# Patient Record
Sex: MALE | Race: WHITE | URBAN - METROPOLITAN AREA
[De-identification: names, ages, dates, MRNs, and addresses within clinical notes are randomized per-mention and may not be internally consistent; named-entity substitution may affect disease eponyms.]

---

## 2022-10-26 ENCOUNTER — APPOINTMENT (RX ONLY)
Dept: URBAN - METROPOLITAN AREA CLINIC 332 | Facility: CLINIC | Age: 52
Setting detail: DERMATOLOGY
End: 2022-10-26

## 2022-10-26 DIAGNOSIS — D22 MELANOCYTIC NEVI: ICD-10-CM

## 2022-10-26 DIAGNOSIS — L72.8 OTHER FOLLICULAR CYSTS OF THE SKIN AND SUBCUTANEOUS TISSUE: ICD-10-CM

## 2022-10-26 DIAGNOSIS — L82.1 OTHER SEBORRHEIC KERATOSIS: ICD-10-CM

## 2022-10-26 PROBLEM — D22.39 MELANOCYTIC NEVI OF OTHER PARTS OF FACE: Status: ACTIVE | Noted: 2022-10-26

## 2022-10-26 PROBLEM — D48.5 NEOPLASM OF UNCERTAIN BEHAVIOR OF SKIN: Status: ACTIVE | Noted: 2022-10-26

## 2022-10-26 PROCEDURE — 99203 OFFICE O/P NEW LOW 30 MIN: CPT | Mod: 25

## 2022-10-26 PROCEDURE — 11102 TANGNTL BX SKIN SINGLE LES: CPT

## 2022-10-26 PROCEDURE — ? OBSERVATION AND MEASURE

## 2022-10-26 PROCEDURE — ? BIOPSY BY SHAVE METHOD

## 2022-10-26 PROCEDURE — ? SUNSCREEN RECOMMENDATIONS

## 2022-10-26 PROCEDURE — ? COUNSELING

## 2022-10-26 ASSESSMENT — LOCATION ZONE DERM
LOCATION ZONE: LEG
LOCATION ZONE: SCALP
LOCATION ZONE: NECK
LOCATION ZONE: FACE

## 2022-10-26 ASSESSMENT — LOCATION SIMPLE DESCRIPTION DERM
LOCATION SIMPLE: POSTERIOR NECK
LOCATION SIMPLE: LEFT SCALP
LOCATION SIMPLE: LEFT CHEEK
LOCATION SIMPLE: LEFT PRETIBIAL REGION
LOCATION SIMPLE: SCALP

## 2022-10-26 ASSESSMENT — LOCATION DETAILED DESCRIPTION DERM
LOCATION DETAILED: LEFT DISTAL PRETIBIAL REGION
LOCATION DETAILED: LEFT INFERIOR POSTERIOR NECK
LOCATION DETAILED: LEFT CENTRAL MALAR CHEEK
LOCATION DETAILED: LEFT MEDIAL FRONTAL SCALP
LOCATION DETAILED: LEFT SUPERIOR PARIETAL SCALP

## 2022-10-26 NOTE — PROCEDURE: BIOPSY BY SHAVE METHOD
Detail Level: Detailed
Depth Of Biopsy: dermis
Was A Bandage Applied: Yes
Size Of Lesion In Cm: 0.8
X Size Of Lesion In Cm: 0
Biopsy Type: H and E
Biopsy Method: double edge Personna blade
Anesthesia Type: 1% lidocaine with epinephrine
Anesthesia Volume In Cc (Will Not Render If 0): 0.5
Hemostasis: Drysol
Wound Care: Petrolatum
Dressing: bandage
Destruction After The Procedure: No
Type Of Destruction Used: Curettage
Curettage Text: The wound bed was treated with curettage after the biopsy was performed.
Cryotherapy Text: The wound bed was treated with cryotherapy after the biopsy was performed.
Electrodesiccation Text: The wound bed was treated with electrodesiccation after the biopsy was performed.
Electrodesiccation And Curettage Text: The wound bed was treated with electrodesiccation and curettage after the biopsy was performed.
Silver Nitrate Text: The wound bed was treated with silver nitrate after the biopsy was performed.
Consent: Verbal consent was obtained and risks were reviewed including but not limited to scarring, infection, bleeding, scabbing, incomplete removal, nerve damage and allergy to anesthesia.
Post-Care Instructions: I reviewed with the patient in detail post-care instructions. Patient is to keep the biopsy site dry overnight, and then apply bacitracin twice daily until healed. Patient may apply hydrogen peroxide soaks to remove any crusting.
Notification Instructions: Patient will be notified of biopsy results. However, patient instructed to call the office if not contacted within 2 weeks.
Billing Type: Third-Party Bill
Information: Selecting Yes will display possible errors in your note based on the variables you have selected. This validation is only offered as a suggestion for you. PLEASE NOTE THAT THE VALIDATION TEXT WILL BE REMOVED WHEN YOU FINALIZE YOUR NOTE. IF YOU WANT TO FAX A PRELIMINARY NOTE YOU WILL NEED TO TOGGLE THIS TO 'NO' IF YOU DO NOT WANT IT IN YOUR FAXED NOTE.

## 2022-12-05 ENCOUNTER — APPOINTMENT (RX ONLY)
Dept: URBAN - METROPOLITAN AREA CLINIC 332 | Facility: CLINIC | Age: 52
Setting detail: DERMATOLOGY
End: 2022-12-05

## 2022-12-05 VITALS — SYSTOLIC BLOOD PRESSURE: 128 MMHG | HEART RATE: 70 BPM | DIASTOLIC BLOOD PRESSURE: 80 MMHG

## 2022-12-05 PROBLEM — C44.319 BASAL CELL CARCINOMA OF SKIN OF OTHER PARTS OF FACE: Status: ACTIVE | Noted: 2022-12-05

## 2022-12-05 PROCEDURE — 17311 MOHS 1 STAGE H/N/HF/G: CPT

## 2022-12-05 PROCEDURE — ? MOHS SURGERY

## 2022-12-05 PROCEDURE — ? PRESCRIPTION

## 2022-12-05 PROCEDURE — 13132 CMPLX RPR F/C/C/M/N/AX/G/H/F: CPT

## 2022-12-05 PROCEDURE — 17312 MOHS ADDL STAGE: CPT

## 2022-12-05 PROCEDURE — ? ADDITIONAL NOTES

## 2022-12-05 RX ORDER — MUPIROCIN 20 MG/G
1 OINTMENT TOPICAL BID
Qty: 22 | Refills: 2 | Status: ERX | COMMUNITY
Start: 2022-12-05

## 2022-12-05 RX ADMIN — MUPIROCIN 1: 20 OINTMENT TOPICAL at 00:00

## 2022-12-05 NOTE — PROCEDURE: MOHS SURGERY
2 person assist/verbal cues/set-up required Mercedes Flap Text: The defect edges were debeveled with a #15 scalpel blade.  Given the location of the defect, shape of the defect and the proximity to free margins a Mercedes flap was deemed most appropriate.  Using a sterile surgical marker, an appropriate advancement flap was drawn incorporating the defect and placing the expected incisions within the relaxed skin tension lines where possible. The area thus outlined was incised deep to adipose tissue with a #15 scalpel blade.  The skin margins were undermined to an appropriate distance in all directions utilizing iris scissors.

## 2022-12-05 NOTE — PROCEDURE: MOHS SURGERY
Subjective     History provided by:  Patient   used: No    Nausea  The primary symptoms include nausea and vomiting. Primary symptoms do not include fever, weight loss, fatigue, abdominal pain, diarrhea, melena, hematemesis, jaundice, hematochezia, dysuria, myalgias, arthralgias or rash. The illness began today. The onset was sudden.   The illness is also significant for chills. The illness does not include anorexia, dysphagia, odynophagia, bloating, constipation, tenesmus, back pain or itching. Associated medical issues do not include inflammatory bowel disease, GERD, gallstones, liver disease, alcohol abuse, PUD, gastric bypass, bowel resection, irritable bowel syndrome, hemorrhoids or diverticulitis.       Review of Systems   Constitutional: Positive for chills. Negative for activity change, appetite change, diaphoresis, fatigue, fever and weight loss.   HENT: Negative for congestion, ear pain and sore throat.    Eyes: Negative for redness.   Respiratory: Negative for cough, chest tightness, shortness of breath and wheezing.    Cardiovascular: Negative for chest pain, palpitations and leg swelling.   Gastrointestinal: Positive for nausea and vomiting. Negative for abdominal pain, anorexia, bloating, constipation, diarrhea, dysphagia, hematemesis, hematochezia, jaundice and melena.   Genitourinary: Negative for dysuria and urgency.   Musculoskeletal: Negative for arthralgias, back pain, myalgias and neck pain.   Skin: Negative for itching, pallor, rash and wound.   Neurological: Negative for dizziness, speech difficulty, weakness and headaches.   Psychiatric/Behavioral: Negative for agitation, behavioral problems, confusion and decreased concentration.   All other systems reviewed and are negative.      Past Medical History:   Diagnosis Date   • Acid reflux    • Diabetes mellitus (CMS/HCC)    • Gallbladder abscess    • GERD (gastroesophageal reflux disease)    • Hypertension    • Palpitation         No Known Allergies    Past Surgical History:   Procedure Laterality Date   • CHOLECYSTECTOMY     • VAGINAL DELIVERY  81; 84    fe/male       Family History   Problem Relation Age of Onset   • Heart disease Mother    • Heart failure Maternal Grandmother        Social History     Socioeconomic History   • Marital status: Single     Spouse name: Not on file   • Number of children: Not on file   • Years of education: Not on file   • Highest education level: Not on file   Tobacco Use   • Smoking status: Former Smoker     Packs/day: 0.25     Types: Cigarettes     Quit date:      Years since quittin.2   • Smokeless tobacco: Never Used   Substance and Sexual Activity   • Alcohol use: No   • Drug use: No   • Sexual activity: Defer     Comment: , works as Teller           Objective   Physical Exam  Vitals and nursing note reviewed.   Constitutional:       General: She is not in acute distress.     Appearance: Normal appearance. She is well-developed. She is not toxic-appearing or diaphoretic.   HENT:      Head: Normocephalic and atraumatic.      Right Ear: External ear normal.      Left Ear: External ear normal.      Nose: Nose normal.      Mouth/Throat:      Pharynx: No oropharyngeal exudate.      Tonsils: No tonsillar exudate.   Eyes:      General: Lids are normal.      Conjunctiva/sclera: Conjunctivae normal.      Pupils: Pupils are equal, round, and reactive to light.   Neck:      Thyroid: No thyromegaly.   Cardiovascular:      Rate and Rhythm: Normal rate and regular rhythm.      Pulses: Normal pulses.      Heart sounds: Normal heart sounds, S1 normal and S2 normal.   Pulmonary:      Effort: Pulmonary effort is normal. No tachypnea or respiratory distress.      Breath sounds: Normal breath sounds. No decreased breath sounds, wheezing or rales.   Chest:      Chest wall: No tenderness.   Abdominal:      General: Bowel sounds are normal. There is no distension.      Palpations: Abdomen is soft.       Tenderness: There is no abdominal tenderness. There is no guarding or rebound.   Musculoskeletal:         General: No tenderness or deformity. Normal range of motion.      Cervical back: Full passive range of motion without pain, normal range of motion and neck supple.   Lymphadenopathy:      Cervical: No cervical adenopathy.   Skin:     General: Skin is warm and dry.      Coloration: Skin is not pale.      Findings: No erythema or rash.   Neurological:      Mental Status: She is alert and oriented to person, place, and time.      GCS: GCS eye subscore is 4. GCS verbal subscore is 5. GCS motor subscore is 6.      Cranial Nerves: No cranial nerve deficit.      Sensory: No sensory deficit.   Psychiatric:         Speech: Speech normal.         Behavior: Behavior normal.         Thought Content: Thought content normal.         Judgment: Judgment normal.         Procedures           ED Course  ED Course as of Mar 25 0756   Wed Mar 24, 2021   0820 IMPRESSION:  Large volume stool.    XR Abdomen KUB [ES]      ED Course User Index  [ES] Jorge Dooley MD                                           MDM  Number of Diagnoses or Management Options     Amount and/or Complexity of Data Reviewed  Clinical lab tests: ordered and reviewed  Tests in the radiology section of CPT®: ordered and reviewed  Tests in the medicine section of CPT®: ordered and reviewed  Review and summarize past medical records: yes  Independent visualization of images, tracings, or specimens: yes    Risk of Complications, Morbidity, and/or Mortality  Presenting problems: moderate  Diagnostic procedures: moderate  Management options: moderate    Patient Progress  Patient progress: stable      Final diagnoses:   Bacterial UTI       ED Disposition  ED Disposition     ED Disposition Condition Comment    Discharge Stable           Elizabeth Kimball PA  [address] [place] [address] [phone]    Schedule an appointment as soon as  possible for a visit in 1 day  REEVALUATE         Medication List      New Prescriptions    cefdinir 300 MG capsule  Commonly known as: OMNICEF  Take 1 capsule by mouth 2 (Two) Times a Day.     ketorolac 10 MG tablet  Commonly known as: TORADOL  Take 1 tablet by mouth Every 8 (Eight) Hours As Needed for Moderate Pain  or Severe Pain .     promethazine 25 MG tablet  Commonly known as: PHENERGAN  Take 1 tablet by mouth Every 6 (Six) Hours As Needed for Nausea or Vomiting.           Where to Get Your Medications      You can get these medications from any pharmacy    Bring a paper prescription for each of these medications  · cefdinir 300 MG capsule  · ketorolac 10 MG tablet  · promethazine 25 MG tablet          Jorge Dooley MD  03/25/21 1854     Vermilion Border Text: The closure involved the vermilion border.

## 2022-12-05 NOTE — PROCEDURE: MOHS SURGERY
Blood pressure 122/64, pulse 63, temperature 97.2 °F (36.2 °C), resp. rate 16, weight 63.3 kg, SpO2 98 %.    Mona Wilcox. [facility]. November 20. She is seen for review of chronic medical problems. She is seated at the dining room table with her sunglasses. She does not respond verbally but opens her eyes looks toward me. She has a slightly disconjugate gaze. Chest is clear anterolaterally. Rhythm is sinus. I hear no cardiac murmur. She has no pedal edema. She has advanced dementia. Medications include p.r.n. acetaminophen, amlodipine 2.5 mg in the morning, artificial tears every 4 hours p.r.n., aspirin 81 mg daily, p.r.n. laxatives, chlorhexidine 0.12% solution twice daily for gingivitis, cranberry capsule 450 mg twice daily, fentanyl 12 µg every 72 hours, furosemide 20 mg daily, hydrocodone with acetaminophen 5-325 milligrams twice daily, lisinopril 20 mg daily p.r.n. nitroglycerin, p.r.n. nystatin powder, Proctosol cream for hemorrhoids and risperidone 0.25 mg daily as well as sertraline 50 mg daily and Senexon S8.6-50 mg 2 tablets twice daily for constipation.    Basic metabolic panel was unremarkable with a creatinine is 0.78, potassium 4.4 on May 30. Hemoglobin is 11 g on May 26, 2016. No additional changes made in medications or treatments.    Next visit: 8 weeks or prn   Dermal Autograft Text: The defect edges were debeveled with a #15 scalpel blade.  Given the location of the defect, shape of the defect and the proximity to free margins a dermal autograft was deemed most appropriate.  Using a sterile surgical marker, the primary defect shape was transferred to the donor site. The area thus outlined was incised deep to adipose tissue with a #15 scalpel blade.  The harvested graft was then trimmed of adipose and epidermal tissue until only dermis was left.  The skin graft was then placed in the primary defect and oriented appropriately.

## 2022-12-05 NOTE — PROCEDURE: MOHS SURGERY
3 Mastoid Interpolation Flap Text: A decision was made to reconstruct the defect utilizing an interpolation axial flap and a staged reconstruction.  A telfa template was made of the defect.  This telfa template was then used to outline the mastoid interpolation flap.  The donor area for the pedicle flap was then injected with anesthesia.  The flap was excised through the skin and subcutaneous tissue down to the layer of the underlying musculature.  The pedicle flap was carefully excised within this deep plane to maintain its blood supply.  The edges of the donor site were undermined.   The donor site was closed in a primary fashion.  The pedicle was then rotated into position and sutured.  Once the tube was sutured into place, adequate blood supply was confirmed with blanching and refill.  The pedicle was then wrapped with xeroform gauze and dressed appropriately with a telfa and gauze bandage to ensure continued blood supply and protect the attached pedicle. 2

## 2022-12-12 ENCOUNTER — APPOINTMENT (RX ONLY)
Dept: URBAN - METROPOLITAN AREA CLINIC 332 | Facility: CLINIC | Age: 52
Setting detail: DERMATOLOGY
End: 2022-12-12

## 2022-12-12 DIAGNOSIS — Z48.02 ENCOUNTER FOR REMOVAL OF SUTURES: ICD-10-CM

## 2022-12-12 PROCEDURE — ? SUTURE REMOVAL (GLOBAL PERIOD)

## 2022-12-12 PROCEDURE — 99024 POSTOP FOLLOW-UP VISIT: CPT

## 2022-12-12 PROCEDURE — ? ADDITIONAL NOTES

## 2022-12-12 ASSESSMENT — LOCATION DETAILED DESCRIPTION DERM: LOCATION DETAILED: RIGHT CENTRAL ZYGOMA

## 2022-12-12 ASSESSMENT — LOCATION SIMPLE DESCRIPTION DERM: LOCATION SIMPLE: RIGHT ZYGOMA

## 2022-12-12 ASSESSMENT — LOCATION ZONE DERM: LOCATION ZONE: FACE

## 2022-12-12 NOTE — PROCEDURE: SUTURE REMOVAL (GLOBAL PERIOD)
Detail Level: Detailed
Add 48698 Cpt? (Important Note: In 2017 The Use Of 12995 Is Being Tracked By Cms To Determine Future Global Period Reimbursement For Global Periods): yes

## 2023-02-23 ENCOUNTER — APPOINTMENT (RX ONLY)
Dept: URBAN - METROPOLITAN AREA CLINIC 332 | Facility: CLINIC | Age: 53
Setting detail: DERMATOLOGY
End: 2023-02-23

## 2023-02-23 DIAGNOSIS — D18.0 HEMANGIOMA: ICD-10-CM

## 2023-02-23 DIAGNOSIS — L82.1 OTHER SEBORRHEIC KERATOSIS: ICD-10-CM

## 2023-02-23 DIAGNOSIS — L81.4 OTHER MELANIN HYPERPIGMENTATION: ICD-10-CM

## 2023-02-23 DIAGNOSIS — L90.5 SCAR CONDITIONS AND FIBROSIS OF SKIN: ICD-10-CM

## 2023-02-23 DIAGNOSIS — D22 MELANOCYTIC NEVI: ICD-10-CM

## 2023-02-23 PROBLEM — D18.01 HEMANGIOMA OF SKIN AND SUBCUTANEOUS TISSUE: Status: ACTIVE | Noted: 2023-02-23

## 2023-02-23 PROBLEM — D23.72 OTHER BENIGN NEOPLASM OF SKIN OF LEFT LOWER LIMB, INCLUDING HIP: Status: ACTIVE | Noted: 2023-02-23

## 2023-02-23 PROBLEM — D22.5 MELANOCYTIC NEVI OF TRUNK: Status: ACTIVE | Noted: 2023-02-23

## 2023-02-23 PROCEDURE — ? FULL BODY SKIN EXAM

## 2023-02-23 PROCEDURE — ? COUNSELING

## 2023-02-23 PROCEDURE — ? TREATMENT REGIMEN

## 2023-02-23 PROCEDURE — 99213 OFFICE O/P EST LOW 20 MIN: CPT

## 2023-02-23 PROCEDURE — ? ADDITIONAL NOTES

## 2023-02-23 ASSESSMENT — LOCATION DETAILED DESCRIPTION DERM
LOCATION DETAILED: RIGHT INFERIOR UPPER BACK
LOCATION DETAILED: UPPER STERNUM
LOCATION DETAILED: LEFT MEDIAL UPPER BACK
LOCATION DETAILED: RIGHT CENTRAL ZYGOMA
LOCATION DETAILED: RIGHT SUPERIOR MEDIAL MIDBACK

## 2023-02-23 ASSESSMENT — LOCATION SIMPLE DESCRIPTION DERM
LOCATION SIMPLE: RIGHT UPPER BACK
LOCATION SIMPLE: CHEST
LOCATION SIMPLE: RIGHT LOWER BACK
LOCATION SIMPLE: LEFT UPPER BACK
LOCATION SIMPLE: RIGHT ZYGOMA

## 2023-02-23 ASSESSMENT — LOCATION ZONE DERM
LOCATION ZONE: FACE
LOCATION ZONE: TRUNK

## 2023-03-29 ENCOUNTER — APPOINTMENT (RX ONLY)
Dept: URBAN - METROPOLITAN AREA CLINIC 332 | Facility: CLINIC | Age: 53
Setting detail: DERMATOLOGY
End: 2023-03-29

## 2023-03-29 DIAGNOSIS — L72.8 OTHER FOLLICULAR CYSTS OF THE SKIN AND SUBCUTANEOUS TISSUE: ICD-10-CM

## 2023-03-29 PROCEDURE — 99212 OFFICE O/P EST SF 10 MIN: CPT

## 2023-03-29 PROCEDURE — ? PRESCRIPTION

## 2023-03-29 PROCEDURE — ? COUNSELING

## 2023-03-29 RX ORDER — DOXYCYCLINE HYCLATE 100 MG/1
1 CAPSULE, GELATIN COATED ORAL BID
Qty: 60 | Refills: 0 | Status: ERX | COMMUNITY
Start: 2023-03-29

## 2023-03-29 RX ADMIN — DOXYCYCLINE HYCLATE 1: 100 CAPSULE, GELATIN COATED ORAL at 00:00

## 2023-03-29 ASSESSMENT — LOCATION DETAILED DESCRIPTION DERM: LOCATION DETAILED: LEFT INFERIOR POSTERIOR NECK

## 2023-03-29 ASSESSMENT — LOCATION SIMPLE DESCRIPTION DERM: LOCATION SIMPLE: POSTERIOR NECK

## 2023-03-29 ASSESSMENT — LOCATION ZONE DERM: LOCATION ZONE: NECK

## 2023-03-29 NOTE — PROCEDURE: COUNSELING
Patient Specific Counseling (Will Not Stick From Patient To Patient): Discussed with patient that the Doxycyline is being used more for a anti inflammation not infection.  Patient asked if he didn?t want to do the antibiotic could he just do the surgery - discussed that we do not do surgeries on actively inflamed cysts and the doxy will also help reduce the size and help require a smaller surgery.  Patient voiced understanding and was agreeable to the plan.
Detail Level: Detailed

## 2023-03-29 NOTE — HPI: CYST
Is This A New Presentation, Or A Follow-Up?: Cyst
Additional History: Patient has inflamed cyst on his left posterior neck x3-4 days

## 2023-04-06 ENCOUNTER — APPOINTMENT (RX ONLY)
Dept: URBAN - METROPOLITAN AREA CLINIC 332 | Facility: CLINIC | Age: 53
Setting detail: DERMATOLOGY
End: 2023-04-06

## 2023-04-06 DIAGNOSIS — L72.8 OTHER FOLLICULAR CYSTS OF THE SKIN AND SUBCUTANEOUS TISSUE: ICD-10-CM | Status: INADEQUATELY CONTROLLED

## 2023-04-06 PROCEDURE — ? COUNSELING

## 2023-04-06 PROCEDURE — ? PRESCRIPTION MEDICATION MANAGEMENT

## 2023-04-06 PROCEDURE — ? DEFER

## 2023-04-06 PROCEDURE — 99214 OFFICE O/P EST MOD 30 MIN: CPT

## 2023-04-06 ASSESSMENT — LOCATION DETAILED DESCRIPTION DERM: LOCATION DETAILED: MID POSTERIOR NECK

## 2023-04-06 ASSESSMENT — LOCATION SIMPLE DESCRIPTION DERM: LOCATION SIMPLE: POSTERIOR NECK

## 2023-04-06 ASSESSMENT — LOCATION ZONE DERM: LOCATION ZONE: NECK

## 2023-04-06 NOTE — PROCEDURE: PRESCRIPTION MEDICATION MANAGEMENT
Continue Regimen: Doxycycline 100 mg bid
Plan: Discussed option of I&D but he declines today and will do warm compress at home and keep bandage over it while draining. We will recheck it in 3 weeks and possibly perform excision at that time. He understands if it is inflamed that day we will have to defer excision. He will continue oral antibiotics until that time.
Detail Level: Zone
Render In Strict Bullet Format?: No

## 2023-06-06 NOTE — PROCEDURE: MOHS SURGERY
[[id number]] Suturegard Intro: Intraoperative tissue expansion was performed, utilizing the SUTUREGARD device, in order to reduce wound tension.

## 2024-07-08 ENCOUNTER — APPOINTMENT (RX ONLY)
Dept: URBAN - METROPOLITAN AREA CLINIC 332 | Facility: CLINIC | Age: 54
Setting detail: DERMATOLOGY
End: 2024-07-08

## 2024-07-08 DIAGNOSIS — L72.0 EPIDERMAL CYST: ICD-10-CM

## 2024-07-08 DIAGNOSIS — L57.8 OTHER SKIN CHANGES DUE TO CHRONIC EXPOSURE TO NONIONIZING RADIATION: ICD-10-CM

## 2024-07-08 DIAGNOSIS — L57.0 ACTINIC KERATOSIS: ICD-10-CM | Status: INADEQUATELY CONTROLLED

## 2024-07-08 DIAGNOSIS — L81.4 OTHER MELANIN HYPERPIGMENTATION: ICD-10-CM

## 2024-07-08 PROBLEM — D48.5 NEOPLASM OF UNCERTAIN BEHAVIOR OF SKIN: Status: ACTIVE | Noted: 2024-07-08

## 2024-07-08 PROCEDURE — ? COUNSELING

## 2024-07-08 PROCEDURE — ? BIOPSY BY SHAVE METHOD

## 2024-07-08 PROCEDURE — 11102 TANGNTL BX SKIN SINGLE LES: CPT

## 2024-07-08 PROCEDURE — 99213 OFFICE O/P EST LOW 20 MIN: CPT | Mod: 25

## 2024-07-08 PROCEDURE — ? SUNSCREEN RECOMMENDATIONS

## 2024-07-08 PROCEDURE — ? COSMETIC EXTRACTIONS

## 2024-07-08 ASSESSMENT — LOCATION SIMPLE DESCRIPTION DERM
LOCATION SIMPLE: RIGHT UPPER ARM
LOCATION SIMPLE: CHEST
LOCATION SIMPLE: LEFT INFERIOR EYELID
LOCATION SIMPLE: RIGHT INDEX FINGER
LOCATION SIMPLE: LEFT UPPER ARM

## 2024-07-08 ASSESSMENT — LOCATION ZONE DERM
LOCATION ZONE: TRUNK
LOCATION ZONE: FINGER
LOCATION ZONE: ARM
LOCATION ZONE: EYELID

## 2024-07-08 ASSESSMENT — LOCATION DETAILED DESCRIPTION DERM
LOCATION DETAILED: LEFT PROXIMAL POSTERIOR UPPER ARM
LOCATION DETAILED: LEFT MEDIAL SUPERIOR CHEST
LOCATION DETAILED: RIGHT PROXIMAL POSTERIOR UPPER ARM
LOCATION DETAILED: RIGHT PROXIMAL DORSAL INDEX FINGER
LOCATION DETAILED: LEFT MEDIAL INFERIOR EYELID